# Patient Record
Sex: FEMALE | Race: WHITE
[De-identification: names, ages, dates, MRNs, and addresses within clinical notes are randomized per-mention and may not be internally consistent; named-entity substitution may affect disease eponyms.]

---

## 2020-05-27 ENCOUNTER — HOSPITAL ENCOUNTER (OUTPATIENT)
Age: 40
End: 2020-05-27
Payer: COMMERCIAL

## 2020-05-27 VITALS — BODY MASS INDEX: 22.6 KG/M2

## 2020-05-27 DIAGNOSIS — N89.8: Primary | ICD-10-CM

## 2020-05-27 LAB — SAMPLE ADEQUACY CONTROL: (no result)

## 2020-05-27 PROCEDURE — 87591 N.GONORRHOEAE DNA AMP PROB: CPT

## 2020-05-27 PROCEDURE — 87491 CHLMYD TRACH DNA AMP PROBE: CPT

## 2024-08-07 ENCOUNTER — HOSPITAL ENCOUNTER (OUTPATIENT)
Age: 44
Discharge: HOME | End: 2024-08-07
Payer: COMMERCIAL

## 2024-08-07 DIAGNOSIS — R11.0: ICD-10-CM

## 2024-08-07 DIAGNOSIS — R10.30: Primary | ICD-10-CM

## 2024-08-07 LAB
ALANINE AMINOTRANSFER ALT/SGPT: 18 U/L (ref 13–56)
ALBUMIN SERPL-MCNC: 4.1 G/DL (ref 3.2–5)
ALKALINE PHOSPHATASE: 63 U/L (ref 45–117)
ANION GAP: 5 (ref 5–15)
AST(SGOT): 20 U/L (ref 15–37)
BUN SERPL-MCNC: 10 MG/DL (ref 7–18)
BUN/CREAT RATIO: 13 RATIO (ref 10–20)
CALCIUM SERPL-MCNC: 8.9 MG/DL (ref 8.5–10.1)
CARBON DIOXIDE: 26 MMOL/L (ref 21–32)
CHLORIDE: 110 MMOL/L (ref 98–107)
DEPRECATED RDW RBC: 45.4 FL (ref 35.1–43.9)
ERYTHROCYTE [DISTWIDTH] IN BLOOD: 13.2 % (ref 11.6–14.6)
EST GLOM FILT RATE - AFR AMER: 105 ML/MIN (ref 60–?)
GLOBULIN: 3.8 G/DL (ref 2.2–4.2)
GLUCOSE: 99 MG/DL (ref 74–106)
HCT VFR BLD AUTO: 39 % (ref 37–47)
HEMOGLOBIN: 12.8 G/DL (ref 12–15)
HGB BLD-MCNC: 12.8 G/DL (ref 12–15)
IMMATURE GRANULOCYTES COUNT: 0.02 X10^3/UL (ref 0–0)
LIPASE: 56 U/L (ref 13–75)
MCV RBC: 94.4 FL (ref 81–99)
MEAN CORP HGB CONC: 32.8 G/DL (ref 32–36)
MEAN PLATELET VOL.: 10.7 FL (ref 6.2–12)
NRBC FLAGGED BY ANALYZER: 0 % (ref 0–5)
PLATELET # BLD: 249 K/MM3 (ref 150–450)
PLATELET COUNT: 249 K/MM3 (ref 150–450)
POTASSIUM: 3.7 MMOL/L (ref 3.5–5.1)
RBC # BLD AUTO: 4.13 M/MM3 (ref 4.2–5.4)
RBC DISTRIBUTION WIDTH CV: 13.2 % (ref 11.6–14.6)
RBC DISTRIBUTION WIDTH SD: 45.4 FL (ref 35.1–43.9)
WBC # BLD AUTO: 6.1 K/MM3 (ref 4.4–11)
WHITE BLOOD COUNT: 6.1 K/MM3 (ref 4.4–11)

## 2024-08-07 PROCEDURE — 85025 COMPLETE CBC W/AUTO DIFF WBC: CPT

## 2024-08-07 PROCEDURE — 83690 ASSAY OF LIPASE: CPT

## 2024-08-07 PROCEDURE — 80053 COMPREHEN METABOLIC PANEL: CPT

## 2024-08-07 NOTE — XMS RPT_ITS
Comprehensive CCD (C-CDA v2.1)  
  
                           Created on: 2024  
  
  
GIA BLUNT NEREIDA  
External Reference #: CDR,PersonID:[id number]  
: 1980  
Sex: Female  
  
Demographics  
  
  
                                        Address             [address] [place]  [address]  
   
                                        Home Phone          [phone]  
   
                                        Home Phone          [phone]  
   
                                        Home Phone          [phone]  
   
                                        Work Phone          [phone]  
   
                                        Work Phone          [phone]  
   
                                        Preferred Language  en  
   
                                        Marital Status        
   
                                        Pro[employer] Affiliation Unknown  
   
                                        Race                White  
   
                                        Ethnic Group        Unknown  
  
  
Author  
  
  
                                        Organization        [facility]  
[facility]  
  
  
Care Team Providers  
  
  
                                Care Team Member Name Role            Phone  
   
                                HASEEB MONDRAGON Unavailable     Unavai  
labKING Riggins Unavailable     Unavailable  
   
                                NO PRIMARY CARE, MD Unavailable     Unavailable  
   
                                VASQUEZ (PPG), SIMRANJOT Unavailable     Unavaila  
ble  
   
                                VASQUEZ (PPG), SIMRANJOT Unavailable     Unavaila  
ble  
   
                                NO REFERRING DR Unavailable     Unavailable  
   
                                VASQUEZ, SIMRANJOT Unavailable     Unavailable  
   
                                VASQUEZ, SIMRANJOT Unavailable     Unavailable  
   
                                GRACE JOSEPH Primary Care    Unavailable  
   
                                KING MACKAY Attending       Unavailable  
   
                                GRACE JOSEPH Primary Care    Unavailable  
   
                                Pending, Provider Primary Care    Unavailable  
   
                                Conrado, Ms. Aubrie Fischer Referring        
 Unavailable  
   
                                Ms. Aubrie Camp Attending        
 Unavailable  
   
                                Ronnie APRN.JARRED, Grace NEREIDA Primary Care Provide  
r [phone]  
   
                                AMARILIS SIMMONS Attending       Unavailable  
   
                                GRACE JOSEPH Primary Care    Unavailable  
   
                                AMARILIS SIMMONS Attending       Unavailable  
   
                                GRACE JOSEPH Primary Care    Unavailable  
   
                                AMARILIS SIMMONS Attending       Unavailable  
   
                                GRACE JOSEPH Primary Care    Unavailable  
  
  
  
Medications  
Current Medications  
  
  
  
                      Medication Drug Class(es) Dates      Sig (Normalized) Sig   
(Original)  
   
                                                    benoxinate   
hydrochloride 4 mg/ml   
/ fluorescein sodium 3   
mg/ml ophthalmic   
solution  
(1 source)                Diagnostic Dye            Start:   
2023  
End:   
2023                                          fluorescein-benoxi  
tacos 0.3-0.4 % 1   
Drop (FLURESS)  
  
  
  
Completed/Discontinued Medications  
  
  
  
                      Medication Drug Class(es) Dates      Sig (Normalized) Sig   
(Original)  
   
                                                    cetirizine   
hydrochloride 10 mg   
oral capsule  
(1 source)                              Histamine-1   
Receptor   
Antagonist                                                  Cetirizine   
(ZYRTEC) 10 mg   
cap Take by   
mouth. 0 Active  
   
                                        Comment on above:   Take by mouth.   
   
                                                    doxycycline   
monohydrate 100 mg   
oral capsule  
(1 source)                              Tetracycline-clas  
s Drug                                  Start:   
2023                              take 1 capsule by   
mouth once daily                        doxycycline   
monohydrate   
(MONODOX) 100 mg   
capsule Take 1   
capsule by mouth   
once daily. 30   
capsule 1   
2023 Active  
   
                                        Comment on above:   Take 1 capsule by mo  
uth once daily.   
   
                                                    MULTIVIT-MINERALS/FE  
RROUS FUM (MULTI   
VITAMIN ORAL)  
(1 source)                                                      MULTIVIT-MINERAL  
S  
/FERROUS FUM   
(MULTI VITAMIN   
ORAL) Take by   
mouth once daily.   
0 Active  
   
                                        Comment on above:   Take by mouth once d  
aily.   
   
                                                    sertraline 50 mg   
oral tablet  
(1 source)                              Serotonin   
Reuptake   
Inhibitor                               Start:   
2018                              take 1 tablet by   
mouth once daily                        sertraline   
(ZOLOFT) 50 mg   
tablet Take 1   
tablet by mouth   
once daily. 30   
tablet 2   
2018 Active  
   
                                        Comment on above:   [facility]   
  
  
  
Problems  
  
  
                      Problem Classification Problem    Date       Documented Da  
te Episodic/Chronic  
   
                                                    Anxiety disorders  
(1 source)                              Anxiety;   
Translations:   
[Anxiety disorder,   
unspecified]                            Onset:   
2013                Chronic  
   
                                                    Blindness and vision   
defects  
(1 source)                              Disorder of   
refraction;   
Translations:   
[Unspecified   
disorder of   
refraction]                             2023          Episodic  
   
                                                    Inflammation;   
infection of eye   
(except that caused by   
tuberculosis or   
sexually   
transmitteddisease)  
(1 source)                              Blepharitis;   
Translations:   
[Squamous   
blepharitis right   
eye, upper and   
lower eyelids]                          2023          Episodic  
   
                                                    Mood disorders  
(1 source)                              Depressive   
disorder;   
Translations:   
[Depression]                            Onset:   
2013                Chronic  
   
                                                    Mood disorders  
(3 sources)                             Major depressive   
disorder, single   
episode,   
unspecified;   
Translations: [MIL   
DEPRESS D/O SINGLE   
E]                                      Onset:   
2017                                            
   
                                                    Other eye disorders  
(1 source)                              Chalazion of left   
upper eyelid;   
Translations:   
[Chalazion left   
upper eyelid]                           2023          Episodic  
   
                                                    Other eye disorders  
(1 source)                              Tear film   
insufficiency;   
Translations: [Dry   
eye syndrome of   
bilateral lacrimal   
glands]                                 2023          Episodic  
   
                                                    Other eye disorders  
(1 source)                              Chalazion left   
upper eyelid;   
Translations:   
[Chalazion of left   
upper eyelid]                           Onset:   
2024                                          Episodic  
   
                                                    Other eye disorders  
(1 source)                              Dry eye syndrome of   
bilateral lacrimal   
glands;   
Translations: [Dry   
eye syndrome of   
both eyes]                              Onset:   
2023                                          Episodic  
   
                                                    Other female genital   
disorders  
(1 source)                              Postcoital   
bleeding;   
Translations:   
[Postcoital and   
contact bleeding]                       Onset:   
10-                10-                Chronic  
  
  
  
Results  
  
  
                          Test Name    Value        Interpretation Reference   
[facility]                                   Facility  
   
                                                    Office Visit (Urgent Care)on  
 2022   
   
                                        Follow-up visit     Diagnoses/Problems  
Assessed  
Acute streptococcal   
pharyngitis (034.0) (J02.0)  
Orders  
Acute streptococcal   
pharyngitis  
Start: Amoxicillin 500 MG   
Oral Tablet; TAKE 1 TABLET   
EVERY 12 HOURS DAILY  
Rx By: Aubrie Camp;   
Dispense: 10 Days ; #:20   
Tablet; Refill: 0;For:   
Acute streptococcal   
pharyngitis; JENI = N; Sent   
To: [facility]  
SocHx: Former smoker  
Tobacco Use Screening;   
Status:Complete; Done:   
2022  
Perform:Not   
Applicable;Ordered;   
For:SocHx: Former smoker;   
Ordered By:Isatu Gray;  
Sore throat  
IO Rapid Strep;   
Status:Complete; Done:   
[id number] 01:37PM  
Performed:In Office;   
Due:2023;Ordered;   
For:Sore throat; Ordered   
By:Aubrie Camp;  
Patient Discussion/Summary  
Rapid in office strep test:   
Positive.  
Antibiotics sent to the   
pharmacy. Administer as   
directed. Do not stop early   
even if symptoms improve.  
Push fluids to stay   
well-hydrated  
May have over-the-counter   
Tylenol and/or ibuprofen as   
needed for pain/fever  
Change toothbrush 24 hours   
after being on antibiotic  
Follow-up with pediatrician  
Go directly to the   
emergency room for   
worsening symptoms such as   
trouble   
breathing/swallowing or any   
facial/neck swelling.  
  
Chief Complaint  
Chief Complaints  
Sore Throat  
History of Present Illness  
42-year-old female presents   
to the urgent care with   
chief complaint of sore   
throat that started 5 days   
ago. States that throat   
pain has been constant and   
worsening. Has increased   
pain with swallowing,   
rating it 7 out of 10. Also   
endorses sweats, nausea,   
nasal congestion and runny   
nose. Has been alternating   
Advil, Tylenol and drinking   
hot tea with minimal relief   
in symptoms. Denies fever,   
body aches, chills, cough,   
vomiting or diarrhea,   
headaches, chest pain,   
trouble breathing or   
swallowing.  
  
Review of Systems  
All other systems have been   
reviewed and are negative   
for complaint.  
  
Active Problems  
Problems  
Sore throat (462) (J02.9)  
Social History  
Problems  
Former smoker (V15.82)   
(Z87.891)  
Allergies  
Medication  
No Known Drug Allergies  
Recorded By: Isatu Gray;   
2022 1:24:24 PM  
Current Meds  
  
Medication NameInstruction  
No Reported Medications  
Vitals  
Vital Signs  
Recorded: [id number] 01:30PM  
Oqkjkzktwmw22.2 F  
Heart Rate73  
Gqiyrokyysb67  
Pkvaizer490  
Fyxnhilbg80  
Height5 ft 6 in  
Nixtsx346 lb  
BMI Qnshpacdrz96.4 kg/m2  
BSA Calculated1.74  
Tobacco Useb) No  
PHQ-2 #1. Over the last 2   
weeks have you felt down,   
depressed or hopeless? (If   
yes, answer PHQ-9 below)No  
PHQ-2 #2. Over the last 2   
weeks have you felt little   
interest or pleasure in   
doing things? (If yes,   
answer PHQ-9 below)No  
Falls Screening (Age 18+)a)   
No falls within the last   
year  
O2 Bdyrpbuwft46  
Pain Scale7  
Physical Exam  
GENERAL: Well-appearing,   
nontoxic, NAD. VS and   
triage notes reviewed.   
Sitting comfortably in exam   
room.  
SKIN: Visualized skin is   
warm and dry.  
HEENT  
Head: Atraumatic  
Eyes: Sclera and   
conjunctivae normal; no   
redness or drainage. PERRL.   
EOMI  
Ears: Hearing grossly   
intact. Bilateral EAC   
patent, TMs christopher grey, non   
bulging.  
Nose: Nares grossly patent   
without discharge  
Face: symmetric  
Oropharynx: MMM.   
Generalized tonsillar   
erythema with exudate   
noted. No petechiae or   
gross swelling noted. Uvula   
midline.  
Neck: Supple with small,   
soft, mobile anterior   
cervical lymphadenopathy.   
Trachea midline.  
CHEST: Normal respiratory   
effort. Speaks in complete   
sentences. Equal chest   
rise. No retractions or   
tripoding. Lungs clear to   
auscultation throughout   
posterior lung fields. No   
wheezes, crackles, or   
rhonchi.  
HEART: Regular rate. S1 AND   
S2 noted. No murmurs, rubs,   
clicks, gallops   
auscultated.  
PSYCH: Normal mood and   
affect.  
NEURO: Alert AND oriented.   
Moves all extremities.   
Speech is clear. Answers   
questions appropriately.   
Gait normal.  
  
Results/Data  
IO Rapid [id number]   
01:37PMAubrie Camp  
Test   
NameResultFlagReference  
IO Rapid StrepPositiveA  
Signatures  
Electronically signed by :   
CHANCE Silva-CNP; Dec 19 2022   
2:11PM EST (Author) Normal                                   [facility]  
   
                                                    Phone Msgon 2022   
   
                                        Phone Msg           --------------------  
-  
From: Julia Olsen  
To: Sivakumar MOBLEY, Majo;  
Sent: 2022 14:11:15   
EDT  
Subject: .GYN VAGINAL   
INFECTION (BV/YEAST)  
pt had sinus infection and   
was taking omicef and now   
has a yeast infection. Pt   
sts diflucan never works   
and Dr usually prescribes   
something else that has an   
applicator? Asked her if it   
was terconazole topical   
vaginal cream which is in   
her med list and she   
thought that was it.  
Question Response  
Last Annual/CPE/Visit   
2021  
Next Annual/CPE/Visit   
2022  
Provider Dr Mackay  
Contact Phone Number   
[phone]  
May we contact you via   
patient portal?  
*If not seen in the last 12   
months, STOP and make a   
problem appointment (first   
available)*  
Are you pregnant or   
nursing? no  
Is there vaginal discharge?   
(thick, watery, chunky,   
etc?) please describe   
starting to have white  
Is there vaginal odor? no  
Is there vaginal burning?   
no  
If yes, is burning with   
urination only? Does it   
burn even when not   
urinating? External   
burning? no  
Is there vaginal itching?   
(internal/external?) yes  
How long have you had these   
symptoms? started 3/27  
Have you tried OTC meds?   
(Monistat, vagisil?) no   
they usually dont work  
Have you been on a recent   
antibiotic? yes omnifec  
Any history of BV, yeast,   
or lichen sclerosus yeast  
Patient Preferred Pharmacy?   
(please update on file) in   
chart is correct pharmacy  
Allergies?  
  
*Send message to MA*  
** On hold pending   
signature **  
Order:terconazole topical   
(terconazole 0.8% vaginal   
cream) 1 ankit Vaginal QHS  
Qty: 20 g Duration: 3 days   
Refills: 0  
Substitutions Allowed Route   
To Pharmacy - [id number]  
  
Last time pt was seen   
23 and a annual coming   
up in Sept and terconazole   
cream was call in last time   
she had a bv infection. I   
will proposed the   
terconazole cream but let   
me know if you think   
differently and that she   
needs to come in for an   
appt.  
---------------------  
From: Linda Castrejon MA  
To: SOCORRO ISSA;  
Sent: 2022 14:22:15   
EDT  
Subject: FW: .GYN VAGINAL   
INFECTION (BV/YEAST)  
---------------------  
From: SOCORRO ISSA  
Sent: 2022 16:46:37   
EDT  
Subject: RE:FW: .GYN   
VAGINAL INFECTION   
(BV/YEAST)  
** Approved **  
Order:terconazole topical   
(terconazole 0.8% vaginal   
cream) 1 ankit Vaginal QHS  
Qty: 20 g Duration: 3 days   
Refills: 0  
Substitutions Allowed Route   
To Pharmacy - [id number]  
Signed by SOCORRO ISSA 2022   
16:46:00 EDT  
terazole for yeast  Normal                                  [facility]  
   
                                                    Basic Panelon 2017   
   
                      Anion gap  11 mmol/L  Normal     8-20       [facility]  
   
                                                    Comment on   
above:                                  Performed By: #### LP8 ####[address] [address]   
   
                                                    BUN (urea   
nitrogen)       14 mg/dL        Normal          7-25            [facility]  
   
                                                    Comment on   
above:                                  Performed By: #### LP8 ####[address] [address]   
   
                                                    BUN/Creatinine   
Ratio           23 mg/mg        High            10-20           [facility]  
   
                                                    Comment on   
above:                                  Performed By: #### LP8 ####[address] [address]   
   
                      Calcium    9.0 mg/dL  Normal     8.5-10.1   [facility]  
   
                                                    Comment on   
above:                                  Performed By: #### LP8 ####[address] [address]   
   
                      Chloride   102 mmol/L Normal          [facility]  
   
                                                    Comment on   
above:                                  Performed By: #### LP8 ####[address] [address]   
   
                      CO2        27 mmol/L  Normal     21-32      [facility]  
   
                                                    Comment on   
above:                                  Performed By: #### LP8 ####[place] [address] [place] [address]   
   
                      Creatinine 0.62 mg/dL Normal     0.51-0.95  [facility]  
   
                                                    Comment on   
above:                                  Performed By: #### LP8 ####[place] [address]   
   
                                                    Glucose mass   
conc            83 mg/dL        Normal          70-99           [facility]  
   
                                                    Comment on   
above:                                  Performed By: #### LP8 ####[place] [address]   
   
                                                    Potassium molar   
conc            3.7 mmol/L      Normal          3.5-5.1         [facility]  
   
                                                    Comment on   
above:                                  Performed By: #### LP8 ####[place] [address]   
   
                      Sodium     136 mmol/L Normal     136-145    [facility]  
   
                                                    Comment on   
above:                                  Performed By: #### LP8 ####[place] [address]   
   
                                                    Hemogramon 2017   
   
                                                    Erythrocyte   
distribution   
width Auto Ratio   
(RBC)           13.3 %          Normal          11.5-15.9       [facility]  
   
                                                    Comment on   
above:                                  Performed By: #### LCBC ####[place] [address]   
   
                                                    Erythrocytes   
(RBC)           3.73 mil/cmm    Low             4.20-5.40       [facility]  
   
                                                    Comment on   
above:                                  Performed By: #### LCBC ####[place] [address]   
   
                      Hematocrit (HCT) 35.8 %     Low        37.0-47.0  [facility]  
   
                                                    Comment on   
above:                                  Performed By: #### LCBC ####[place] [address]   
   
                                                    Hemoglobin mass   
conc (Bld)      11.9 g/dL       Low             12.0-16.0       [facility]  
   
                                                    Comment on   
above:                                  Performed By: #### LCBC ####[place] [address]   
   
                      MCH        31.9 pg    High       27.0-31.0  [facility]  
   
                                                    Comment on   
above:                                  Performed By: #### LCBC ####[address]   
[place], [place] [address]   
   
                                                    MCHC mass conc   
(RBC)           33.2 %          Normal          32.0-36.0       [facility]  
   
                                                    Comment on   
above:                                  Performed By: #### LCBC ####[place] [address]   
   
                      MCV        96.0 fL    Normal     81.0-99.0  [facility]  
   
                                                    Comment on   
above:                                  Performed By: #### LCBC ####[place] [address]   
   
                                                    Platelet mean   
volume (PMV)    10.2 fL         Normal          7.1-10.5        [facility]  
   
                                                    Comment on   
above:                                  Performed By: #### LCBC ####[place] [address]   
   
                      Platelets  204 thou/cmm Normal     150-400    [facility]  
   
                                                    Comment on   
above:                                  Performed By: #### LCBC ####[place] [address]   
   
                      WBC (Leukocytes) 5.2 thou/cmm Normal     4.8-10.8   [facility]  
   
                                                    Comment on   
above:                                  Performed By: #### LCBC ####[place] [address]   
   
                                                    Hepatic Panelon 2017   
   
                      Albumin    4.0 g/dL   Normal     3.4-5.0    [facility]  
   
                                                    Comment on   
above:                                  Performed By: #### LHEPA ####[address]  
[place] [address]   
   
                                                    Albumin/Globulin   
Ratio           1.2 {ratio}     Normal          0.9-2.4         [facility]  
   
                                                    Comment on   
above:                                  Performed By: #### LHEPA ####[address]  
[place] [address]   
   
                                                    Alkaline   
phosphatase   
(ALP)           75 U/L          Normal                    [facility]  
   
                                                    Comment on   
above:                                  Performed By: #### LHEPA ####[address]  
[place] [address]   
   
                      ALT-SGPT Blood 18 U/L     Normal     12-78      [facility]  
   
                                                    Comment on   
above:                                  Performed By: #### LHEPA ####[facility] [place] [address]   
   
                      AST-SGOT Blood 20 U/L     Normal     15-37      [facility]  
   
                                                    Comment on   
above:                                  Performed By: #### LHEPA ####[place] [id number]   
   
                                                    Bilirubin   
(direct)        0.11 mg/dL      Normal          0.00-0.20       [facility]  
   
                                                    Comment on   
above:                                  Performed By: #### LHEPA ####[place] [address]   
   
                                                    Bilirubin   
(indirect)      0.3 mg/dL       Normal          0.0-0.7         [facility]  
   
                                                    Comment on   
above:                                  Performed By: #### LHEPA ####[place] [id number]   
   
                      Bilirubin Ql (U) 0.4 mg/dL  Normal     0.2-1.0    [facility]  
   
                                                    Comment on   
above:                                  Performed By: #### LHEPA ####[place] [address]   
   
                      Protein    7.4 g/dL   Normal     6.4-8.2    [facility]  
   
                                                    Comment on   
above:                                  Performed By: #### LHEPA ####[place] [id number]   
   
                                                    MDRD eGFRon 2017   
   
                          eGFR (non-black) mL/min/{1.73_m2} Normal       >60mL/m  
in/1.7  
3m2                                     [facility]  
   
                                                    Comment on   
above:                                  Result Comment: If the patient is [place]  
n [employer], multiply the   
result by 1.210.   
   
                                                            Performed By: #### L  
GFR ####[place] [id number]   
   
                                                    TSH reflex ([place])on 20  
17   
   
                                                    Thyroid   
stimulating   
hormone (TSH)   0.73 uIU/mL     Normal          0.34-4.82       [facility]  
   
                                                    Comment on   
above:                                  Result Comment: Free T4 reflexed if TSH   
is less than or greater than   
the reference range.   
   
                                                            Performed By: #### L  
TSHR ####[place] [id number]   
  
  
  
Encounters  
  
  
                          Encounter Date Encounter Type Care Provider Facility  
   
                                                    Start: 2024  
End: 2024     ambulatory          AMARILIS SIMMONS   Facility:[facility]  
   
                                                    Start: 2023  
End: 2023     ambulatory          AMARILIS SIMMONS   Facility:[facility]  
   
                                                    Start: 2023  
End: 2023     ambulatory          AMARILIS SIMMONS   Facility:[facility]  
   
                                                    Start: 2023  
End: 2023                         Patient encounter   
procedure                               Amarilis Simmons MD  
Work Phone:   
[phone]                          [facility]  
   
                                        Comment on above:   Chalazion of left up  
per eyelid (Primary Dx);  
Squamous blepharitis of upper and lower eyelids of both eyes;  
Dry eye syndrome of both eyes;  
Refraction error   
   
                          Start: 2022 ambulatory   Provider Gallito Cano  
ty:[id number]  
   
                                                    Start: 10-  
End: 10-     ambulatory          KING MACKAY      Facility:[facility]  
   
                          Start: 2022 ambulatory   GRACE Aguiar  
[facility]  
   
                                Start: 2017 Ambulatory      HASEEB MO   
[facility]  
   
                                                    Start: 2017  
End: 2017     Ambulatory          STEFANIE OVALLE (PPG) Facility:[facility]  
  
  
  
Plan of Treatment  
  
  
                          Date         Care Activity Detail       Author  
   
                          Start: 2023 Influenza vaccination Influenza Vacc  
ine (#1) [facility]  
   
                          Start: 2022 Mammography  Mammogram Screening [facility]  
   
                          Start: 2018 HPV Testing  HPV Testing  [facility]  
   
                          Start: 2018 Pap Testing  Pap Testing  [facility]  
   
                                Start: 2001 Urine microalbumin profile DTa  
P,Tdap,Td Vaccine (1   
- Tdap)                                 [facility]  
   
                          Start: 2000 Hepatitis C Screening Hepatitis C Sc  
reening [facility]  
   
                          Start: 1982 Covid-19 Vaccine (#1) Covid-19 Vacci  
ne (#1) [facility]  
   
                                        Start: 1982   Hepatitis B Vaccine   
(1 of 3   
- 3-dose series)                        Hepatitis B Vaccine (1   
of 3 - 3-dose series)                   [facility]  
c  
  
  
  
Immunizations  
  
  
                      Immunization Date Immunization Notes      Care Provider Dionisio akbar  
   
                                        10-          influenza virus vacc  
ine,   
unspecified formulation                             Amarliis Simmons MD  
Work Phone:   
[phone]                          [facility]  
  
  
  
Payers  
  
  
                          Date         Payer Category Payer        Policy ID  
   
                          2023   Unknown                   [id number]  
   
                                2023      Unknown         [facility] PRE  
LISA SELF   
FUNDED [id number]   
2023-Present [phone]   
[address] [place]   
[address] PPO                          [id number]  
[id number]  
   
                          2009   Unknown                   [id number]  
   
                          1982   Unknown                   [id number]   
[id number]  
   
                          1982   Unknown                   [id number]   
[id number]  
   
                          1982   Unknown                   [id number]   
[id number]  
   
                                       Unknown                   [id number]  
   
                                       Unknown                   [id number]  
  
  
  
Social History  
  
  
                          Date         Type         Detail       Facility  
   
                                        Start: 2013   [facility]  
[facility]                 [facility]  
   
                                                      
End: 2010     History of tobacco use Current smoker      [facility]  
   
                                                      
End: 2010     History of tobacco use Cigarette Smoker    [facility]  
   
                                Start: 2023 Alcohol intake  Current drinke  
r of alcohol   
(finding)                               [facility]  
   
                                                    Start: 2019  
End: 2023                         History of Social   
function                                            [facility]  
   
                                                    Start: 2019  
End: 2023     Tobacco use panel                       [facility]  
   
                                                            National Score (1-10  
0),   
lower number is lower   
risk                      79                        [facility]  
   
                          Start: 2017 Alcohol Comment occassionally [facility]  
   
                          Start: 1982 Sex Assigned At Birth Not on file  [facility]  
  
  
  
Progress note 2024  
  
  
                                Note Date & Type Note            Facility  
   
                                        2024 Note     HNO ID: [id number]  
Author: AMARILIS SIMMONS MD  
Service: ?  
Author Type: Physician  
Type: Progress Notes  
Filed: 2024 15:49  
Note Text:  
(H00.14) Chalazion of left upper eyelid   
(primary encounter diagnosis)  
Comment: Discussed improved findings on   
exam s/p minor surgery excision  
2023.  
Plan: Monitor  
(H01.02A, H01.02B) Squamous blepharitis   
of upper and lower eyelids of both eyes  
Comment: Discussed improved findings on   
exam.  
Plan: Continue warm water compresses and   
lid hygiene both eyes twice a day.  
Patient finished Doxy orally.  
(H04.123) Dry eye syndrome of both eyes  
Comment: Discussed findings on exam.  
Plan: Recommend Artificial tears both   
eyes as needed.  
Previous History:  
(H52.7) Refraction error  
Comment: Stable  
Plan: Monitor  
Follow up for complete eye exam/lid check   
in 2-3 mos.  
I have confirmed and edited as necessary   
the relevant ophthalmic history, ROS,  
and the neuro exam findings as obtained   
by others. I have seen and examined this  
patient.  
I have discussed the case and the   
management of this patient's care with   
the  
Resident/Fellow, if applicable. I also   
have reviewed and agree with the  
assessment and plan as stated above and   
agree with all of its relevant  
components.  
Amarilis Simmons MD 2024   
3:47 PM                                 [facility]  
  
  
  
Progress note 2023  
  
  
                                Note Date & Type Note            Facility  
   
                                        2023 Note     HNO ID: [id number]  
Author: Amarilis Simmons MD  
Service: ?  
Author Type: Physician  
Type: Progress Notes  
Filed: 2023 5:24 PM  
Note Text:  
(H00.14) Chalazion of left upper eyelid   
(primary encounter diagnosis)  
Comment: Discussed findings on exam.  
Plan: Options discussed including   
observation vs minor surgery excision  
left upper lid chalazion.  
Patient opts for minor surgery excision   
in office today.  
A consent form was reviewed in detail and   
signed.  
PROCEDURE NOTE:  
Left upper lid was prepped and draped   
sterilely.  
1% Xylocaine with epi was used to   
anesthetize the left upper lid.  
An 11 blade was used to incise the   
tarsus. A curette was used to remove  
the contents.  
Bleeding was controlled and Erythromycin   
ophthalmic jodi was applied.  
Start Erythromycin ophthalmic jodi left   
eye three times a day x one week  
then discontinue.  
Rx sent. Side effects reviewed.  
Previous History:  
(H01.02A, H01.02B) Squamous blepharitis   
of upper and lower eyelids of  
both eyes  
Comment: Discussed findings on exam.  
Plan: Continue warm water compresses and   
lid hygiene both eyes twice a  
day.  
Continue Doxycycline 100 mg orally once a   
day.  
Rx sent.  
Side effects discussed with patient.  
Patient to take with food.  
(H04.123) Dry eye syndrome of both eyes  
Comment: Discussed findings on exam.  
Plan: Recommend Artificial tears both   
eyes as needed.  
(H52.7) Refraction error  
Comment: Stable  
Plan: Monitor  
Follow up for one month lid check.  
I have confirmed and edited as necessary   
the relevant ophthalmic history,  
ROS, and the neuro exam findings as   
obtained by others. I have seen and  
examined this patient.  
I have discussed the case and the   
management of this patient's care with  
the Resident/Fellow, if applicable. I   
also have reviewed and agree with  
the assessment and plan as stated above   
and agree with all of its relevant  
components.  
Amarilis Simmons MD 2023   
5:03 PM                                 [facility]  
  
  
  
Progress note 2023  
  
  
                                Note Date & Type Note            Facility  
   
                                        2023 Note     HNO ID: [id number]  
Author: Amarilis Simmons MD  
Service: ?  
Author Type: Physician  
Type: Progress Notes  
Filed: 2023 2:13 PM  
Note Text:  
(H00.14) Chalazion of left upper eyelid   
(primary encounter diagnosis)  
Comment: Discussed findings on exam.  
Plan: Options discussed including   
observation vs minor surgery excision  
left upper lid chalazion.  
Pamphlet printed.  
(H01.02A, H01.02B) Squamous blepharitis   
of upper and lower eyelids of  
both eyes  
Comment: Discussed findings on exam.  
Plan: Start warm water compresses and lid   
hygiene both eyes twice a day.  
Start Doxycycline 100 mg orally once a   
day.  
Rx sent.  
Side effects discussed with patient.  
Patient to take with food.  
(H04.123) Dry eye syndrome of both eyes  
Comment: Discussed findings on exam.  
Plan: Recommend Artificial tears both   
eyes as needed.  
(H52.7) Refraction error  
Comment: Stable  
Plan: Monitor  
Follow up for minor surgery left upper   
lid end of the day.  
I have confirmed and edited as necessary   
the relevant ophthalmic history,  
ROS, and the neuro exam findings as   
obtained by others. I have seen and  
examined this patient.  
I have discussed the case and the   
management of this patient's care with  
the Resident/Fellow, if applicable. I   
also have reviewed and agree with  
the assessment and plan as stated above   
and agree with all of its relevant  
components.  
Amarilis Simmons MD 2023   
2:06 PM                                 [facility]  
  
  
  
Instructions 2023  
                              Patient Instructions  
  
                                Note Date & Type Note            Facility  
   
                                        2023 Instructions   
  
  
Amarilis Simmons MD - 2023 2:11 PM   
ESTFormatting of this note might be   
different from the original.  
Images from the original note were not   
included.  
  
  
  
  
Electronically signed by Amarilis Simmons MD at 2023 2:11 PM EST  
  
documented in this encounter            [facility]  
  
  
  
History of Present illness Narrative 2023  
                 Amarilis Simmons MD - 2023 1:53 PM EST  
  
                                Note Date & Type Note            Facility  
   
                                                    2023 History of Presen  
t   
illness Narrative                       Formatting of this note might be   
different from the original.  
(H00.14) Chalazion of left upper   
eyelid (primary encounter   
diagnosis)  
Comment: Discussed findings on   
exam.  
Plan: Options discussed including   
observation vs minor surgery   
excision left upper lid chalazion.  
Pamphlet printed.  
  
(H01.02A, H01.02B) Squamous   
blepharitis of upper and lower   
eyelids of both eyes  
Comment: Discussed findings on   
exam.  
Plan: Start warm water compresses   
and lid hygiene both eyes twice a   
day.  
Start Doxycycline 100 mg orally   
once a day.  
Rx sent.  
Side effects discussed with   
patient.  
Patient to take with food.  
  
(H04.123) Dry eye syndrome of both   
eyes  
Comment: Discussed findings on   
exam.  
Plan: Recommend Artificial tears   
both eyes as needed.  
  
(H52.7) Refraction error  
Comment: Stable  
Plan: Monitor  
  
Follow up for minor surgery left   
upper lid end of the day.  
  
I have confirmed and edited as   
necessary the relevant ophthalmic   
history, ROS, and the neuro exam   
findings as obtained by others. I   
have seen and examined this   
patient.  
I have discussed the case and the   
management of this patient's care   
with the Resident/Fellow, if   
applicable. I also have reviewed   
and agree with the assessment and   
plan as stated above and agree with   
all of its relevant components.  
  
Amarilis Simmons MD 2023 2:06 PM  
Electronically signed by Amarilis Simmons MD at 2023 2:13   
PM EST  
documented in this encounter            [facility]  
  
  
  
History of Past illness Narrative 2016  
  
  
                                Note Date & Type Note            Facility  
  
  
  
                                                    2016 History of Past i  
llness Narrative   
  
  
  
                          Problem      Noted Date   Diagnosed Date Resolved Date  
   
                          Term pregnancy 2016  
  
documented as of this encounter (statuses as of 2023)  
[facility]  
  
Evaluation note  
  
  
                                Note Date & Type Note            Facility  
  
  
  
                                                    Evaluation note   
  
  
  
                                                    Diagnosis  
   
                                                      
  
  
Chalazion of left upper eyelid- Primary  
  
  
Chalazion  
   
                                                      
  
  
Squamous blepharitis of upper and lower eyelids of both eyes  
   
                                                      
  
  
Dry eye syndrome of both eyes  
   
                                                      
  
  
Refraction error  
  
  
Unspecified disorder of refraction and accommodation  
  
documented in this encounter  
[facility]  
  
Summary Purpose  
  
  
                                                      
  
  
  
Family History  
No Family History Records FoundNo Family History Records FoundNo Family History 
Records FoundNo Family History Records FoundNo Family History Records FoundNo 
Family History Records FoundNo Family History Records FoundNo Family History 
Records Found  
  
Advance Directives  
No Advanced Directives Records FoundNo Advanced Directives Records FoundNo 
Advanced Directives Records FoundNo Advanced Directives Records FoundNo Advanced
Directives Records FoundNo Advanced Directives Records FoundNo Advanced 
Directives Records FoundNo Advanced Directives Records Found  
  
Additional Source Comments  
  
  
  
                                                    INFORMATION SOURCE (unrecogn  
ized section and content)  
   
                                          
  
  
  
                                        DATE CREATED        AUTHOR  
   
                                2018                      [facility]  
  
  
  
                                DATE CREATED    AUTHOR          AUTHOR'S ORGANIZ  
ATION  
   
                                2018                      [facility]  
[facility]  
  
  
  
                                DATE CREATED    AUTHOR          AUTHOR'S ORGANIZ  
ATION  
   
                                2018                      [facility]  
[facility]  
  
  
  
                                DATE CREATED    AUTHOR          AUTHOR'S ORGANIZ  
ATION  
   
                                2022                      [facility]  
  
  
  
                                DATE CREATED    AUTHOR          AUTHOR'S ORGANIZ  
ATION  
   
                                10/09/2022                      [facility]  
  
  
  
                                DATE CREATED    AUTHOR          AUTHOR'S ORGANIZ  
ATION  
   
                                2022                       [employer]  
  
  
  
                                DATE CREATED    AUTHOR          AUTHOR'S ORGANIZ  
ATION  
   
                                2023                      [facility]  
  
  
  
                                DATE CREATED    AUTHOR          AUTHOR'S ORGANIZ  
ATION  
   
                                2024                      [facility]  
  
  
  
  
  
                                                    Source Comments (unrecognize  
d section and content)  
   
                                                    In the event this informatio  
n is protected by the Federal Confidentiality of   
Alcohol   
and Drug Abuse Patient Records regulations: This information has been disclosed 
to   
you from records protected by Federal confidentiality rules (42 CFR Part 2). The
  
Federal rules prohibit you from making any further disclosure of this 
information   
unless further disclosure is expressly permitted by the written consent of the 
person   
to whom it pertains or as otherwise permitted by 42 CFR Part 2. A general   
authorization for the release of medical or other information is NOT sufficient 
for   
this purpose. The Federal rules restrict any use of the information to 
criminally   
investigate or prosecute any alcohol or drug abuse patient.[facility]  
  
  
  
  
                                                    Reason for Visit (unrecogniz  
ed section and content)  
   
                                          
  
  
  
                                        Reason              Comments  
   
                                        Bump On Left Upper Lid   
  
  
  
  
  
                                                    Care Teams (unrecognized sec  
tion and content)  
   
                                          
  
  
  
                      Team Member Relationship Specialty  Start Date End Date  
   
                                                      
  
  
Grace Joseph APRN.CNP  
  
  
NPI: [id number]  
  
  
[address]  
  
  
[place] [address]  
  
  
[phone] (Work)  
  
  
[phone] (Fax) PCP - General   Family Medicine 23          
  
  
FOR RECORDS PERTAINING TO PATIENTS WHO ARE OR HAVE BEEN ENROLLED IN A CHEMICAL 
DEPENDENCY/SUBSTANCEABUSE PROGRAM, SOME INFORMATION MAY BE OMITTED. This 
clinical summary was aggregated from multiple sources. Caution should be 
exercised in using it in the provision of clinical care. This summary normalizes
information from multiple sources, and as a consequence, information in this 
document may materially change the coding, format and clinical context of 
patient data. In addition, data may be omitted in some cases. CLINICAL DECISIONS
SHOULD BE BASED ON THE PRIMARY CLINICAL RECORDS. [facility] [employer] [facility]. provides 
no warranty or guarantee of the accuracy or completeness of information in this 
document.